# Patient Record
Sex: FEMALE | Race: WHITE | NOT HISPANIC OR LATINO | Employment: UNEMPLOYED | URBAN - NONMETROPOLITAN AREA
[De-identification: names, ages, dates, MRNs, and addresses within clinical notes are randomized per-mention and may not be internally consistent; named-entity substitution may affect disease eponyms.]

---

## 2017-05-15 ENCOUNTER — OFFICE VISIT (OUTPATIENT)
Dept: OBSTETRICS AND GYNECOLOGY | Facility: CLINIC | Age: 27
End: 2017-05-15

## 2017-05-15 VITALS
DIASTOLIC BLOOD PRESSURE: 68 MMHG | HEIGHT: 59 IN | WEIGHT: 119 LBS | BODY MASS INDEX: 23.99 KG/M2 | SYSTOLIC BLOOD PRESSURE: 112 MMHG

## 2017-05-15 DIAGNOSIS — R59.9 LYMPH NODE ENLARGEMENT: Primary | ICD-10-CM

## 2017-05-15 PROCEDURE — 99213 OFFICE O/P EST LOW 20 MIN: CPT | Performed by: NURSE PRACTITIONER

## 2017-05-15 RX ORDER — BUTALBITAL, ACETAMINOPHEN, CAFFEINE AND CODEINE PHOSPHATE 300; 50; 40; 30 MG/1; MG/1; MG/1; MG/1
CAPSULE ORAL
COMMUNITY
End: 2018-02-07

## 2017-07-10 ENCOUNTER — TELEPHONE (OUTPATIENT)
Dept: OBSTETRICS AND GYNECOLOGY | Facility: CLINIC | Age: 27
End: 2017-07-10

## 2017-07-10 NOTE — TELEPHONE ENCOUNTER
----- Message from Sandra Elaine sent at 7/10/2017 11:43 AM CDT -----  Contact: [phone]  PT CALLED AND IS A GABRIELA PT. SHE SAID THAT [facility] HAS BEEN TRYING TO GET A HOLD OF GABRIELA'S OFFCE. THEY ARE HAVING PROBLEMS FILLING HER BC. PT WOULD LIKE A CALL BACK.    THANKS

## 2017-08-18 ENCOUNTER — HOSPITAL ENCOUNTER (EMERGENCY)
Facility: HOSPITAL | Age: 27
Discharge: HOME | End: 2017-08-19
Admitting: EMERGENCY MEDICINE

## 2017-08-18 DIAGNOSIS — R51.9 HEADACHE, UNSPECIFIED HEADACHE TYPE: Primary | ICD-10-CM

## 2017-08-18 PROCEDURE — 99283 EMERGENCY DEPT VISIT LOW MDM: CPT

## 2017-08-18 PROCEDURE — 96372 THER/PROPH/DIAG INJ SC/IM: CPT

## 2017-08-19 VITALS
HEIGHT: 59 IN | HEART RATE: 89 BPM | SYSTOLIC BLOOD PRESSURE: 112 MMHG | RESPIRATION RATE: 16 BRPM | TEMPERATURE: 98.4 F | WEIGHT: 125 LBS | DIASTOLIC BLOOD PRESSURE: 81 MMHG | OXYGEN SATURATION: 99 % | BODY MASS INDEX: 25.2 KG/M2

## 2017-08-19 PROCEDURE — 25010000002 DIPHENHYDRAMINE PER 50 MG: Performed by: EMERGENCY MEDICINE

## 2017-08-19 PROCEDURE — 96372 THER/PROPH/DIAG INJ SC/IM: CPT

## 2017-08-19 PROCEDURE — 25010000002 METOCLOPRAMIDE PER 10 MG: Performed by: EMERGENCY MEDICINE

## 2017-08-19 PROCEDURE — 25010000002 KETOROLAC TROMETHAMINE PER 15 MG: Performed by: EMERGENCY MEDICINE

## 2017-08-19 RX ORDER — CETIRIZINE HYDROCHLORIDE 10 MG/1
10 TABLET ORAL DAILY
COMMUNITY

## 2017-08-19 RX ORDER — DIPHENHYDRAMINE HYDROCHLORIDE 50 MG/ML
25 INJECTION INTRAMUSCULAR; INTRAVENOUS ONCE
Status: COMPLETED | OUTPATIENT
Start: 2017-08-19 | End: 2017-08-19

## 2017-08-19 RX ORDER — IBUPROFEN 600 MG/1
600 TABLET ORAL EVERY 8 HOURS PRN
Qty: 20 TABLET | Refills: 0 | Status: SHIPPED | OUTPATIENT
Start: 2017-08-19 | End: 2017-08-26

## 2017-08-19 RX ORDER — METOCLOPRAMIDE HYDROCHLORIDE 5 MG/ML
10 INJECTION INTRAMUSCULAR; INTRAVENOUS ONCE
Status: COMPLETED | OUTPATIENT
Start: 2017-08-19 | End: 2017-08-19

## 2017-08-19 RX ORDER — KETOROLAC TROMETHAMINE 15 MG/ML
60 INJECTION, SOLUTION INTRAMUSCULAR; INTRAVENOUS ONCE
Status: COMPLETED | OUTPATIENT
Start: 2017-08-19 | End: 2017-08-19

## 2017-08-19 RX ADMIN — DIPHENHYDRAMINE HYDROCHLORIDE 25 MG: 50 INJECTION INTRAMUSCULAR; INTRAVENOUS at 02:33

## 2017-08-19 RX ADMIN — METOCLOPRAMIDE 10 MG: 5 INJECTION, SOLUTION INTRAMUSCULAR; INTRAVENOUS at 02:32

## 2017-08-19 RX ADMIN — KETOROLAC TROMETHAMINE 60 MG: 15 INJECTION, SOLUTION INTRAMUSCULAR; INTRAVENOUS at 02:36

## 2017-08-19 NOTE — ED PROVIDER NOTES
"Subjective   HPI Comments: She refused IV medication and to start an IV she just wants\" shots of Toradol and what ever and to go home\"    Patient is a 27 y.o. female presenting with migraines.   History provided by:  Patient  Migraine   Pain location:  Occipital  Quality:  Sharp  Radiates to:  Does not radiate  Onset quality:  Gradual  Duration:  2 days  Timing:  Constant  Progression:  Waxing and waning  Chronicity:  Recurrent  Similar to prior headaches: yes    Context: bright light and emotional stress    Context: not caffeine, not coughing and not eating    Relieved by:  Nothing  Worsened by:  Nothing  Associated symptoms: no abdominal pain, no back pain, no congestion, no cough, no dizziness, no fatigue, no fever, no nausea, no neck pain, no numbness, no seizures and no sore throat        Review of Systems   Constitutional: Negative for activity change, appetite change, fatigue and fever.   HENT: Negative for congestion, facial swelling, mouth sores, nosebleeds, sore throat and trouble swallowing.    Eyes: Negative for discharge, redness and itching.   Respiratory: Negative for apnea, cough and wheezing.    Cardiovascular: Negative for chest pain and palpitations.   Gastrointestinal: Negative for abdominal pain, blood in stool and nausea.   Endocrine: Negative for cold intolerance, heat intolerance, polydipsia, polyphagia and polyuria.   Genitourinary: Negative for difficulty urinating, dysuria, flank pain, frequency and hematuria.   Musculoskeletal: Negative for back pain, gait problem, joint swelling and neck pain.   Skin: Negative.  Negative for color change, pallor and rash.   Allergic/Immunologic: Negative for environmental allergies.   Neurological: Negative for dizziness, seizures, syncope, speech difficulty, light-headedness, numbness and headaches.   Hematological: Negative for adenopathy.   Psychiatric/Behavioral: Negative for agitation, behavioral problems, confusion and sleep disturbance. The " patient is not nervous/anxious.        Past Medical History:   Diagnosis Date   • Acute bronchitis    • Acute otitis media    • Acute pharyngitis    • Allergic rhinitis    • Cough    • Dermatitis    • Dizziness and giddiness    • Gastroesophageal reflux disease    • Insertion of implantable subdermal contraceptive     insertion of subcutaneous contraceptive   • Irregular menstruation    • Migraine    • Nausea    • Nausea and vomiting    • Routine postpartum follow-up    • Surveillance of implantable subdermal contraceptive    • Torticollis    • Upper respiratory infection    • Viral gastroenteritis        Allergies   Allergen Reactions   • Codeine    • Morphine And Related        Past Surgical History:   Procedure Laterality Date   •  SECTION PRIMARY  2013   • INJECTION OF MEDICATION  2016    toradol   • INJECTION OF MEDICATION  2016    zofran       Family History   Problem Relation Age of Onset   • Other Brother      brittle bone disease   • Alzheimer's disease Other    • Asthma Other    • Bipolar disorder Other    • Dementia Other    • Diabetes Other    • Heart disease Other    • Hypertension Other    • Migraines Other    • Thyroid disease Other    • Other Other      smoking tobacco       Social History     Social History   • Marital status: Single     Spouse name: N/A   • Number of children: N/A   • Years of education: N/A     Social History Main Topics   • Smoking status: Former Smoker   • Smokeless tobacco: None   • Alcohol use 0.6 oz/week     1 Glasses of wine per week   • Drug use: No   • Sexual activity: Yes     Partners: Male     Birth control/ protection: Pill     Other Topics Concern   • None     Social History Narrative           Objective   Physical Exam   Constitutional: She is oriented to person, place, and time. She appears well-developed and well-nourished.   HENT:   Head: Normocephalic and atraumatic.   Nose: Nose normal.   Mouth/Throat: Oropharynx is clear and moist.    Eyes: Conjunctivae and EOM are normal. Pupils are equal, round, and reactive to light.   Neck: Normal range of motion. Neck supple.   Cardiovascular: Normal rate, regular rhythm, normal heart sounds and intact distal pulses.    Pulmonary/Chest: Effort normal and breath sounds normal.   Abdominal: Soft. Bowel sounds are normal.   Musculoskeletal: Normal range of motion.   Neurological: She is alert and oriented to person, place, and time.   Skin: Skin is warm and dry.   Psychiatric: She has a normal mood and affect. Her behavior is normal. Judgment and thought content normal.   Nursing note and vitals reviewed.      Procedures         ED Course  ED Course                  MDM    Final diagnoses:   Headache, unspecified headache type            Sebastian Pyle MD  08/19/17 0228

## 2017-08-19 NOTE — ED TRIAGE NOTES
Pt with hx of migraine headaches presents to ED with c/o headache x2 days.  Pt states she woke up two days ago with a crick in her neck and she has had a headache ever since.  Pt states she takes Magnesium daily as a preventive but has run out of her usual headache pain medication, Fiorcet.

## 2018-01-02 RX ORDER — DROSPIRENONE AND ETHINYL ESTRADIOL 0.02-3(28)
1 KIT ORAL DAILY
Qty: 28 TABLET | Refills: 2 | Status: SHIPPED | OUTPATIENT
Start: 2018-01-02 | End: 2018-05-01 | Stop reason: SDUPTHER

## 2018-02-07 ENCOUNTER — APPOINTMENT (OUTPATIENT)
Dept: LAB | Facility: HOSPITAL | Age: 28
End: 2018-02-07

## 2018-02-07 ENCOUNTER — OFFICE VISIT (OUTPATIENT)
Dept: OBSTETRICS AND GYNECOLOGY | Facility: CLINIC | Age: 28
End: 2018-02-07

## 2018-02-07 VITALS
DIASTOLIC BLOOD PRESSURE: 89 MMHG | SYSTOLIC BLOOD PRESSURE: 126 MMHG | HEART RATE: 87 BPM | WEIGHT: 125 LBS | BODY MASS INDEX: 25.2 KG/M2 | HEIGHT: 59 IN

## 2018-02-07 DIAGNOSIS — R59.9 LYMPH NODE ENLARGEMENT: ICD-10-CM

## 2018-02-07 DIAGNOSIS — Z30.41 SURVEILLANCE OF CONTRACEPTIVE PILL: ICD-10-CM

## 2018-02-07 DIAGNOSIS — N92.1 BREAKTHROUGH BLEEDING ON BIRTH CONTROL PILLS: Primary | ICD-10-CM

## 2018-02-07 LAB
ALBUMIN SERPL-MCNC: 4.6 G/DL (ref 3.4–4.8)
ALBUMIN/GLOB SERPL: 1.4 G/DL (ref 1.1–1.8)
ALP SERPL-CCNC: 73 U/L (ref 38–126)
ALT SERPL W P-5'-P-CCNC: 31 U/L (ref 9–52)
ANION GAP SERPL CALCULATED.3IONS-SCNC: 12 MMOL/L (ref 5–15)
AST SERPL-CCNC: 24 U/L (ref 14–36)
BILIRUB SERPL-MCNC: <0.1 MG/DL (ref 0.2–1.3)
BUN BLD-MCNC: 9 MG/DL (ref 7–21)
BUN/CREAT SERPL: 13.4 (ref 7–25)
CALCIUM SPEC-SCNC: 9.9 MG/DL (ref 8.4–10.2)
CHLORIDE SERPL-SCNC: 100 MMOL/L (ref 95–110)
CO2 SERPL-SCNC: 24 MMOL/L (ref 22–31)
CREAT BLD-MCNC: 0.67 MG/DL (ref 0.5–1)
DEPRECATED RDW RBC AUTO: 39.8 FL (ref 36.4–46.3)
ERYTHROCYTE [DISTWIDTH] IN BLOOD BY AUTOMATED COUNT: 12.3 % (ref 11.5–14.5)
GFR SERPL CREATININE-BSD FRML MDRD: 105 ML/MIN/1.73 (ref 71–165)
GLOBULIN UR ELPH-MCNC: 3.3 GM/DL (ref 2.3–3.5)
GLUCOSE BLD-MCNC: 94 MG/DL (ref 60–100)
HBA1C MFR BLD: 5.2 % (ref 4–5.6)
HCT VFR BLD AUTO: 37.8 % (ref 35–45)
HGB BLD-MCNC: 12.6 G/DL (ref 12–15.5)
MCH RBC QN AUTO: 29.8 PG (ref 26.5–34)
MCHC RBC AUTO-ENTMCNC: 33.3 G/DL (ref 31.4–36)
MCV RBC AUTO: 89.4 FL (ref 80–98)
PLATELET # BLD AUTO: 305 10*3/MM3 (ref 150–450)
PMV BLD AUTO: 10.7 FL (ref 8–12)
POTASSIUM BLD-SCNC: 4.6 MMOL/L (ref 3.5–5.1)
PROT SERPL-MCNC: 7.9 G/DL (ref 6.3–8.6)
RBC # BLD AUTO: 4.23 10*6/MM3 (ref 3.77–5.16)
SODIUM BLD-SCNC: 136 MMOL/L (ref 137–145)
TSH SERPL DL<=0.05 MIU/L-ACNC: 1.87 MIU/ML (ref 0.46–4.68)
WBC NRBC COR # BLD: 9.06 10*3/MM3 (ref 3.2–9.8)

## 2018-02-07 PROCEDURE — 99213 OFFICE O/P EST LOW 20 MIN: CPT | Performed by: NURSE PRACTITIONER

## 2018-02-07 PROCEDURE — 83036 HEMOGLOBIN GLYCOSYLATED A1C: CPT | Performed by: NURSE PRACTITIONER

## 2018-02-07 PROCEDURE — 82670 ASSAY OF TOTAL ESTRADIOL: CPT | Performed by: NURSE PRACTITIONER

## 2018-02-07 PROCEDURE — 80050 GENERAL HEALTH PANEL: CPT | Performed by: NURSE PRACTITIONER

## 2018-02-07 PROCEDURE — 84144 ASSAY OF PROGESTERONE: CPT | Performed by: NURSE PRACTITIONER

## 2018-02-07 PROCEDURE — 36415 COLL VENOUS BLD VENIPUNCTURE: CPT | Performed by: NURSE PRACTITIONER

## 2018-02-07 RX ORDER — BUTALBITAL, ACETAMINOPHEN AND CAFFEINE 50; 325; 40 MG/1; MG/1; MG/1
TABLET ORAL
Refills: 2 | COMMUNITY
Start: 2018-01-08 | End: 2019-11-08 | Stop reason: HOSPADM

## 2018-02-07 RX ORDER — LANOLIN ALCOHOL/MO/W.PET/CERES
1000 CREAM (GRAM) TOPICAL DAILY
COMMUNITY

## 2018-02-07 RX ORDER — DOXYCYCLINE HYCLATE 100 MG/1
100 CAPSULE ORAL 2 TIMES DAILY
Qty: 14 CAPSULE | Refills: 0 | Status: SHIPPED | OUTPATIENT
Start: 2018-02-07 | End: 2018-02-14

## 2018-02-07 RX ORDER — CYCLOBENZAPRINE HCL 10 MG
TABLET ORAL
Refills: 0 | COMMUNITY
Start: 2018-01-08

## 2018-02-07 RX ORDER — ONDANSETRON 4 MG/1
TABLET, ORALLY DISINTEGRATING ORAL
Refills: 1 | COMMUNITY
Start: 2018-01-02

## 2018-02-07 RX ORDER — LORATADINE 10 MG/1
CAPSULE, LIQUID FILLED ORAL
COMMUNITY

## 2018-02-07 RX ORDER — FLUCONAZOLE 150 MG/1
TABLET ORAL
Qty: 2 TABLET | Refills: 0 | Status: SHIPPED | OUTPATIENT
Start: 2018-02-07 | End: 2019-11-08 | Stop reason: HOSPADM

## 2018-02-07 NOTE — PROGRESS NOTES
Subjective   Chief Complaint   Patient presents with   • Menstrual Problem     Grace Guerin is a 27 y.o. year old  presenting to be seen with complaints of trouble with her menses.   Current birth control method: OCP (estrogen/progesterone).    Patient's last menstrual period was 2018.    The last time she recalls having predictable regular cycles was December.  She started a period the first week of January as expected and it lasted the normal 3-5 days, then 10 days later she had another 3-5 day period in the middle of her pack and does not recall missing pills, taking new medications concurrently or being more stressed than usual.    · Additional notable symptoms: none  · Changes in weight: weight has been stable  · Recent increase in stress? no  · Is there associated pain ? no    Past 6 month menstrual history:    Cycle Frequency: regular, predictable and consistent every 28 - 32 days   Menstrual cycle character: flow is typically normal   Cycle Duration: 3 - 5   Number of heavy days of flows: 0   Dysmenorrhea: mild and is not affecting her activities of daily living   PMS: mild and is not affecting her activities of daily living   Intermenstrual bleeding present: {yes   Post-coital bleeding present: no     She is sexually active.  In the past 12 months there has not been new sexual partners.  Condoms are not typically used.  She would not like to be screened for STD's at today's exam.     Also c/o swollen lymph nodes in the right groin with tenderness when she walks and sits certain ways.    The following portions of the patient's history were reviewed and updated as appropriate:problem list, current medications, allergies, past medical history, past social history and past surgical history    Smoking status: Former Smoker                                                              Packs/day: 0.00      Years: 0.00         Smokeless status: Not on file                       Review of Systems  "  Constitutional: Negative for activity change, appetite change, fatigue and unexpected weight change.   HENT: Negative for congestion and trouble swallowing.    Respiratory: Negative for chest tightness and shortness of breath.    Cardiovascular: Negative for chest pain, palpitations and leg swelling.   Gastrointestinal: Negative for abdominal distention, abdominal pain, blood in stool, constipation, diarrhea, nausea and vomiting.   Endocrine: Negative for cold intolerance, heat intolerance, polydipsia, polyphagia and polyuria.   Genitourinary: Positive for menstrual problem. Negative for difficulty urinating, dyspareunia, dysuria, genital sores, pelvic pain, urgency, vaginal bleeding, vaginal discharge and vaginal pain.        Pain in right groin; reports tick bite a few months ago in that area.     Musculoskeletal: Negative for gait problem and myalgias.   Skin: Negative for color change, pallor and rash.   Neurological: Negative for dizziness, weakness, light-headedness and headaches.   Hematological: Negative for adenopathy.   Psychiatric/Behavioral: Negative for agitation, confusion, dysphoric mood, self-injury and suicidal ideas. The patient is not nervous/anxious.          Objective   /89  Pulse 87  Ht 149.9 cm (59\")  Wt 56.7 kg (125 lb)  LMP 02/07/2018  Breastfeeding? No  BMI 25.25 kg/m2    General:  well developed; well nourished  no acute distress  appears stated age   Skin:  No suspicious lesions seen   Thyroid: normal to inspection and palpation   Lungs:  breathing is unlabored  clear to auscultation bilaterally   Heart:  regular rate and rhythm, S1, S2 normal, no murmur, click, rub or gallop   Breasts:  Not performed.   Abdomen: soft, non-tender; no masses  no umbilical or inginual hernias are present  no hepato-splenomegaly   Pelvis: Clinical staff was present for exam  External genitalia:  normal appearance of the external genitalia including Bartholin's and [place]'s glands.  Left inguinal " lymph nodes not palpable. Right inguinal lymph nodes slightly enlarged and tender to light palpation.      Lab Review   Last pap: 9/13/16 normal    Imaging   No data reviewed         Diagnoses and all orders for this visit:    Breakthrough bleeding on birth control pills  -     TSH  -     Progesterone  -     Estradiol  -     Hemoglobin A1c  -     Comprehensive Metabolic Panel  -     CBC (No Diff)  -     doxycycline (VIBRAMYCIN) 100 MG capsule; Take 1 capsule by mouth 2 (Two) Times a Day for 7 days.  -     fluconazole (DIFLUCAN) 150 MG tablet; Take 1 tablet by mouth today and repeat in 4 days.    Surveillance of contraceptive pill    Lymph node enlargement        This note was electronically signed.    Joycelyn Reynoso, ANNABEL    February 7, 2018

## 2018-02-08 ENCOUNTER — TELEPHONE (OUTPATIENT)
Dept: OBSTETRICS AND GYNECOLOGY | Facility: CLINIC | Age: 28
End: 2018-02-08

## 2018-02-08 LAB
ESTRADIOL SERPL HS-MCNC: 33 PG/ML
PROGEST SERPL-MCNC: 0.1 NG/ML

## 2018-02-08 NOTE — TELEPHONE ENCOUNTER
----- Message from ANNABEL Turk sent at 2/8/2018  8:23 AM CST -----  Please let her know that her hormones were normal.

## 2018-05-02 RX ORDER — DROSPIRENONE AND ETHINYL ESTRADIOL TABLETS 0.02-3(28)
KIT ORAL
Qty: 28 TABLET | Refills: 12 | Status: SHIPPED | OUTPATIENT
Start: 2018-05-02 | End: 2019-03-20 | Stop reason: SDUPTHER

## 2018-09-13 ENCOUNTER — TRANSCRIBE ORDERS (OUTPATIENT)
Dept: PHYSICAL THERAPY | Facility: HOSPITAL | Age: 28
End: 2018-09-13

## 2018-09-13 DIAGNOSIS — G43.019 COMMON MIGRAINE WITH INTRACTABLE MIGRAINE: Primary | ICD-10-CM

## 2018-09-20 ENCOUNTER — HOSPITAL ENCOUNTER (OUTPATIENT)
Dept: PHYSICAL THERAPY | Facility: HOSPITAL | Age: 28
Setting detail: THERAPIES SERIES
Discharge: HOME | End: 2018-09-20

## 2018-09-20 DIAGNOSIS — G43.111 INTRACTABLE MIGRAINE WITH AURA WITH STATUS MIGRAINOSUS: Primary | ICD-10-CM

## 2018-09-20 PROCEDURE — 97161 PT EVAL LOW COMPLEX 20 MIN: CPT

## 2018-09-20 NOTE — THERAPY EVALUATION
Outpatient Physical Therapy Ortho Initial Evaluation  [facility]     Patient Name: Grace Guerin  : 1990  MRN: [id number]  Today's Date: 2018      Visit Date: 2018    Patient Active Problem List   Diagnosis   • Surveillance of contraceptive pill   • Breakthrough bleeding on birth control pills        Past Medical History:   Diagnosis Date   • Acute bronchitis    • Acute otitis media    • Acute pharyngitis    • Allergic rhinitis    • Cough    • Dermatitis    • Dizziness and giddiness    • Gastroesophageal reflux disease    • Insertion of implantable subdermal contraceptive     insertion of subcutaneous contraceptive   • Irregular menstruation    • Migraine    • Nausea    • Nausea and vomiting    • Routine postpartum follow-up    • Surveillance of implantable subdermal contraceptive    • Torticollis    • Upper respiratory infection    • Viral gastroenteritis         Past Surgical History:   Procedure Laterality Date   •  SECTION PRIMARY  2013   • INJECTION OF MEDICATION  2016    toradol   • INJECTION OF MEDICATION  2016    zofran       Visit Dx:     ICD-10-CM ICD-9-CM   1. Intractable migraine with aura with status migrainosus G43.111 346.03             Patient History     Row Name 18 1300             History    Chief Complaint Pain  -MS      Type of Pain Shoulder pain;Neck pain   headaches  -MS      Date Current Problem(s) Began --   about 2 years ago  -MS      Brief Description of Current Complaint Patient is 29 yo who lives at home with her parents and her 4 yo child.  Patient has suffered from migraine headaches of insidious origin for approximately two year. Patient cites auras and numerous triggers which precede a full blown migraine attack such as hot and rainy weather. Patient's pain is in an L shaped pattern that run down the back of her head and neck where is diverges to the right and also presents in her right shoulder extending through  "upper trapezius and medial scapula. Patient also reports severe pain behind her right eye during migraines. Patient was seeing a chiropractor for several months earlier this year but she found that the results were mixed: sometimes improving and sometimes worsening her symptoms. Patient cannot take \"tryptans\" class of drugs which are often helpful for migraines as she reacts severely to them. Patient takes Flexeril and Fioricet (acetaminophen, butalbital, and caffeine) when she has an ongoing migraine which typically last 2-3 days during which always stays at home. She reports that she get aphasia and cannot drive when her symptoms are severe. Patient has small heart murmur which they told her not worry about for now (runs in her family). 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 meds; supplements; special glases (lens blocks out irritating light wavelengths (glasses help)   -MS      What clinical tests have you had for this problem? CT scan;Blood Work  -MS      Results of Clinical Tests Cat scan and blood work found nothing remarkable  -MS      History of Previous Related Injuries   -MS      Are you or can you be pregnant No  -MS         Pain     Pain Location Head   behind right eye, right scapula  -MS      Pain at Present 3  -MS      Pain at Best 0  -MS      Pain at Worst 10  -MS      Pain Frequency --   maybe two migraine a month  -MS      Pain Description Aching   feels like " knot in her shoulder  -MS      What Performance Factors Make the Current Problem(s) WORSE? low pressure; rain; hot weather; lights; driving at night; heavy lifting with right arm  -MS      What Performance Factors Make the Current Problem(s) BETTER? special glasses; ice pack on neck and back of head; meds; cold cloths, dark rooms; sleep  -MS      Is your sleep disturbed? No  -MS      Is medication used to assist with sleep? No  -MS      What position do you sleep in? Right sidelying  -MS      Difficulties with ADL's? child handling; mopping; bending over and scrubbing  -MS      Difficulties with recreational activities? pole dancing classes  -MS         Daily Activities    Primary Language English  -MS         Safety    Are you being hurt, hit, or frightened by anyone at home or in your life? No  -MS      Are you being neglected by a caregiver No  -MS        User Key  (r) = Recorded By, (t) = Taken By, (c) = Cosigned By    Initials Name Provider Type    Devonte Garcia PT Physical Therapist                PT Ortho     Row Name 09/20/18 1300       Posture/Observations    Alignment Options Forward head;Rounded shoulders  -MS       Cervical Palpation    Cervical Palpation- Location? Suboccipital;SCM;Cervical facets;Levator scapula;Upper traps;Occiput   patient presents mild palpable tenderness/increased tone  -MS    Occiput Bilateral:;Tender  -MS    Suboccipital Bilateral:;Tender  -MS    SCM Guarded/taut  -MS    Cervical Facets Right:;Elicits spasm   palpation of R C5-C6 refers pain to patient's right shoulder  -MS    Levator Scapula Bilateral:;Tender  -MS    Upper Traps Bilateral:;Tender  -MS       General ROM    GENERAL ROM COMMENTS UE and cervical spine WNL  -MS       MMT (Manual Muscle Testing)    General MMT Comments Shoulder ABD and flex/EWR/IR 4+/5 throughout  -MS      User Key  (r) = Recorded By, (t) = Taken By, (c) = Cosigned By    Initials Name Provider Type    Devonte Garcia PT Physical  Therapist                      Therapy Education  Education Details: Discussed with patient that muscle tightness in her neck and shoulders can be factor in producing headaches           PT OP Goals     Row Name 09/20/18 1300          PT Short Term Goals    STG Date to Achieve 10/11/18  -MS     STG 1 patient will be independent with HEP  -MS     STG 2 patient will presents with right shoulder pain of 0/10 at rest or with neck movement  -MS     STG 3 Patient will present with reduced palpable muscle tightness in her upper traps bilaterally  -MS     STG 4 patient will presents with reduced frequency of migraine headaches  -MS        Time Calculation    PT Goal Re-Cert Due Date 10/11/18  -MS       User Key  (r) = Recorded By, (t) = Taken By, (c) = Cosigned By    Initials Name Provider Type    MS Devonte Wheat, PT Physical Therapist                PT Assessment/Plan     Row Name 09/20/18 0758          PT Assessment    Functional Limitations Limitations in community activities;Performance in leisure activities;Limitations in functional capacity and performance  -MS     Impairments Pain;Muscle strength;Posture;Impaired postural alignment  -MS     Assessment Comments Patient presents with migraine headaches with frequency of severe symptoms in the range of 2-3 episodes per month. Patient also presents with low level constant pain in the pack of her head and neck which also radiates into her right shoulder and can be exacerbated by turning her head to the right. Pateint presents with palpable tendenes mostly on the right side of her neck but also generalized muscle tightnes hrough her suboccipitals, neck, shoulders bilaterally. Patient will benefit from skilled physical herapy including manual therapy to reduce pain and associated muscle tension, with the possible reduction in migraine frequency and severity of migraine symptoms.   -MS     Please refer to paper survey for additional self-reported information Yes  -MS      Rehab Potential Good  -MS     Patient/caregiver participated in establishment of treatment plan and goals Yes  -MS     Patient would benefit from skilled therapy intervention Yes  -MS        PT Plan    PT Frequency 2x/week  -MS     Planned CPT's? PT EVAL LOW COMPLEXITY: 14215;PT THER PROC EA 15 MIN: 04404;PT THER ACT EA 15 MIN: 12125;PT ELECTRICAL STIM UNATTEND: ;PT MANUAL THERAPY EA 15 MIN: 21980;PT HOT/COLD PACK WC NONMCARE: 20821  -MS     Physical Therapy Interventions (Optional Details) stretching;strengthening;postural re-education;modalities;joint mobilization;home exercise program  -MS       User Key  (r) = Recorded By, (t) = Taken By, (c) = Cosigned By    Initials Name Provider Type    Devonte Garcia, PT Physical Therapist                                        Time Calculation:     Therapy Suggested Charges     Code   Minutes Charges    None             Start Time: 1255  Stop Time: 1344  Time Calculation (min): 49 min     Therapy Charges for Today     Code Description Service Date Service Provider Modifiers Qty    22489232274 HC PT EVAL LOW COMPLEXITY 2 9/20/2018 Devonte Wheat, PT GP 1                    Devonte Wheat PT  9/20/2018

## 2018-10-03 ENCOUNTER — APPOINTMENT (OUTPATIENT)
Dept: PHYSICAL THERAPY | Facility: HOSPITAL | Age: 28
End: 2018-10-03

## 2018-10-08 ENCOUNTER — HOSPITAL ENCOUNTER (OUTPATIENT)
Dept: PHYSICAL THERAPY | Facility: HOSPITAL | Age: 28
Setting detail: THERAPIES SERIES
Discharge: HOME | End: 2018-10-08

## 2018-10-08 DIAGNOSIS — G43.111 INTRACTABLE MIGRAINE WITH AURA WITH STATUS MIGRAINOSUS: Primary | ICD-10-CM

## 2018-10-08 PROCEDURE — 97140 MANUAL THERAPY 1/> REGIONS: CPT

## 2018-10-08 PROCEDURE — 97110 THERAPEUTIC EXERCISES: CPT

## 2018-10-08 NOTE — THERAPY TREATMENT NOTE
Outpatient Physical Therapy Ortho Treatment Note  [facility]     Patient Name: Grace Guerin  : 1990  MRN: [id number]  Today's Date: 10/8/2018      Visit Date: 10/08/2018   Pt's pre tx pain 3/10 post tx pain 4/10.    Pt reports 0% improvement.   2 2/ visits w/ 8 visits approved.   Next recert 10/18/2018.    Visit Dx:    ICD-10-CM ICD-9-CM   1. Intractable migraine with aura with status migrainosus G43.111 346.03       Patient Active Problem List   Diagnosis   • Surveillance of contraceptive pill   • Breakthrough bleeding on birth control pills        Past Medical History:   Diagnosis Date   • Acute bronchitis    • Acute otitis media    • Acute pharyngitis    • Allergic rhinitis    • Cough    • Dermatitis    • Dizziness and giddiness    • Gastroesophageal reflux disease    • Insertion of implantable subdermal contraceptive     insertion of subcutaneous contraceptive   • Irregular menstruation    • Migraine    • Nausea    • Nausea and vomiting    • Routine postpartum follow-up    • Surveillance of implantable subdermal contraceptive    • Torticollis    • Upper respiratory infection    • Viral gastroenteritis         Past Surgical History:   Procedure Laterality Date   •  SECTION PRIMARY  2013   • INJECTION OF MEDICATION  2016    toradol   • INJECTION OF MEDICATION  2016    zofran                             PT Assessment/Plan     Row Name 10/08/18 1437          PT Assessment    Assessment Comments pt tolerated tx well.  pt demo'd some palpable tenderness on R side of her neck.  Pt verbalized understanding of HEP. no new goals met.   -        PT Plan    PT Frequency 2x/week  -     PT Plan Comments cont poc f/u HEP  -       User Key  (r) = Recorded By, (t) = Taken By, (c) = Cosigned By    Initials Name Provider Type     Vania Barrientos, PTA Physical Therapy Assistant                Modalities     Row Name 10/08/18 1432             Ice    Ice Applied No   pt refused  ice and e-stim  -        User Key  (r) = Recorded By, (t) = Taken By, (c) = Cosigned By    Initials Name Provider Type     Vania Barrientos, PTA Physical Therapy Assistant                Exercises     Row Name 10/08/18 1435             Subjective Comments    Subjective Comments Pt reports had a migraine ~1 week ago that has just recently let go.    -         Subjective Pain    Able to rate subjective pain? yes  -      Pre-Treatment Pain Level 3   normal amount of pain  -      Post-Treatment Pain Level 4  -AH         Exercise 1    Exercise Name 1 Manual cervical  traction and sub occipital release   -AH      Time 1 12  -AH         Exercise 2    Exercise Name 2 chin tucks   -AH      Reps 2 10  -AH      Time 2 5 sec hold   -AH         Exercise 3    Exercise Name 3 UT stretch B  -AH      Reps 3 2  -AH      Time 3 30 sec each  -AH         Exercise 4    Exercise Name 4 lev S  -AH      Reps 4 2  -AH      Time 4 30 sec each   -AH         Exercise 5    Exercise Name 5 doorway S  -AH      Sets 5 2  -AH      Reps 5 30sec each   -AH         Exercise 6    Exercise Name 6 scap squeeze w/ red t-band   -      Sets 6 2  -AH      Reps 6 10  -AH      Time 6 5 sec hold   -AH        User Key  (r) = Recorded By, (t) = Taken By, (c) = Cosigned By    Initials Name Provider Type     Vania Barrientos, PTA Physical Therapy Assistant                               PT OP Goals     Row Name 10/08/18 1435          PT Short Term Goals    STG Date to Achieve 10/11/18  -     STG 1 patient will be independent with HEP  -     STG 2 patient will presents with right shoulder pain of 0/10 at rest or with neck movement  -     STG 3 Patient will present with reduced palpable muscle tightness in her upper traps bilaterally  -     STG 4 patient will presents with reduced frequency of migraine headaches  -        Time Calculation    PT Goal Re-Cert Due Date 10/11/18  -       User Key  (r) = Recorded By, (t) = Taken By, (c) = Cosigned By     Initials Name Provider Type     Vania Barrientos PTA Physical Therapy Assistant          Therapy Education  Education Details: given handouts for doorway s, lev s, ut s, chin tucks and scap squeeze w/ t-band  Given: HEP  Program: New  How Provided: Demonstration, Written  Provided to: Patient  Level of Understanding: Verbalized, Demonstrated              Time Calculation:   Start Time: 1435  Stop Time: 1515  Time Calculation (min): 40 min  Therapy Suggested Charges     Code   Minutes Charges    None           Therapy Charges for Today     Code Description Service Date Service Provider Modifiers Qty    39809371872 HC PT THER PROC EA 15 MIN 10/8/2018 Vania Barrientos, OSORIO GP 2    86809654656 HC PT MANUAL THERAPY EA 15 MIN 10/8/2018 Vania Barrientos, OSORIO GP 1                    Vania Barrientos PTA  10/8/2018

## 2019-03-21 RX ORDER — DROSPIRENONE AND ETHINYL ESTRADIOL TABLETS 0.02-3(28)
KIT ORAL
Qty: 28 TABLET | Refills: 0 | Status: SHIPPED | OUTPATIENT
Start: 2019-03-21 | End: 2019-04-22 | Stop reason: SDUPTHER

## 2019-04-18 DIAGNOSIS — Z30.019 ENCOUNTER FOR FEMALE BIRTH CONTROL: Primary | ICD-10-CM

## 2019-04-18 RX ORDER — DROSPIRENONE AND ETHINYL ESTRADIOL 0.02-3(28)
KIT ORAL
Qty: 28 TABLET | Refills: 3 | OUTPATIENT
Start: 2019-04-18

## 2019-04-22 ENCOUNTER — TELEPHONE (OUTPATIENT)
Dept: OBSTETRICS AND GYNECOLOGY | Facility: CLINIC | Age: 29
End: 2019-04-22

## 2019-04-22 RX ORDER — DROSPIRENONE AND ETHINYL ESTRADIOL 0.02-3(28)
1 KIT ORAL DAILY
Qty: 28 TABLET | Refills: 5 | Status: SHIPPED | OUTPATIENT
Start: 2019-04-22 | End: 2019-08-19 | Stop reason: SDUPTHER

## 2019-04-22 NOTE — TELEPHONE ENCOUNTER
Called and spoke with the pt.  I informed her that I sent in a 6 month supply of her BC to [facility], but that after that 6 month period, it will be time for her to have a pap since her last pap was in 2016.  Pt verbalized understanding.

## 2019-08-20 RX ORDER — DROSPIRENONE AND ETHINYL ESTRADIOL 0.02-3(28)
1 KIT ORAL DAILY
Qty: 28 TABLET | Refills: 0 | Status: SHIPPED | OUTPATIENT
Start: 2019-08-20 | End: 2019-09-16 | Stop reason: SDUPTHER

## 2019-09-16 ENCOUNTER — TELEPHONE (OUTPATIENT)
Dept: OBSTETRICS AND GYNECOLOGY | Facility: CLINIC | Age: 29
End: 2019-09-16

## 2019-09-16 RX ORDER — DROSPIRENONE AND ETHINYL ESTRADIOL 0.02-3(28)
1 KIT ORAL DAILY
Qty: 28 TABLET | Refills: 0 | OUTPATIENT
Start: 2019-09-16

## 2019-09-16 RX ORDER — DROSPIRENONE AND ETHINYL ESTRADIOL 0.02-3(28)
1 KIT ORAL DAILY
Qty: 28 TABLET | Refills: 0 | Status: SHIPPED | OUTPATIENT
Start: 2019-09-16 | End: 2019-11-08 | Stop reason: SDUPTHER

## 2019-09-16 NOTE — TELEPHONE ENCOUNTER
THIS PT'S NUMBER IS NOT IN SERVICE.  I WILL SEND IN ONLY A 1 MONTH REFILL.  SHE WAS TOLD IN April THAT SHE HAD TO COME IN FOR A PAP AND IT IS DOCUMENTED THAT SHE VERBALIZED UNDERSTANDING THAT SHE NEEDS ONE.

## 2019-11-08 ENCOUNTER — OFFICE VISIT (OUTPATIENT)
Dept: OBSTETRICS AND GYNECOLOGY | Facility: CLINIC | Age: 29
End: 2019-11-08

## 2019-11-08 ENCOUNTER — TELEPHONE (OUTPATIENT)
Dept: OBSTETRICS AND GYNECOLOGY | Facility: CLINIC | Age: 29
End: 2019-11-08

## 2019-11-08 VITALS
DIASTOLIC BLOOD PRESSURE: 60 MMHG | BODY MASS INDEX: 23.39 KG/M2 | WEIGHT: 116 LBS | HEIGHT: 59 IN | SYSTOLIC BLOOD PRESSURE: 102 MMHG

## 2019-11-08 DIAGNOSIS — G43.101 MIGRAINE WITH AURA AND WITH STATUS MIGRAINOSUS, NOT INTRACTABLE: ICD-10-CM

## 2019-11-08 DIAGNOSIS — Z30.41 SURVEILLANCE FOR BIRTH CONTROL, ORAL CONTRACEPTIVES: Primary | ICD-10-CM

## 2019-11-08 DIAGNOSIS — Z01.419 PAP TEST, AS PART OF ROUTINE GYNECOLOGICAL EXAMINATION: ICD-10-CM

## 2019-11-08 PROCEDURE — 99395 PREV VISIT EST AGE 18-39: CPT | Performed by: NURSE PRACTITIONER

## 2019-11-08 PROCEDURE — G0123 SCREEN CERV/VAG THIN LAYER: HCPCS | Performed by: NURSE PRACTITIONER

## 2019-11-08 PROCEDURE — 87624 HPV HI-RISK TYP POOLED RSLT: CPT | Performed by: NURSE PRACTITIONER

## 2019-11-08 RX ORDER — DROSPIRENONE AND ETHINYL ESTRADIOL 0.02-3(28)
1 KIT ORAL DAILY
Qty: 28 TABLET | Refills: 0 | Status: SHIPPED | OUTPATIENT
Start: 2019-11-08 | End: 2019-11-08

## 2019-11-08 RX ORDER — TOPIRAMATE 25 MG/1
25 TABLET ORAL 2 TIMES DAILY
Refills: 0 | COMMUNITY
Start: 2019-10-31

## 2019-11-08 NOTE — PROGRESS NOTES
"Subjective   Grace Guerin is a 29 y.o. here for birth control and gyn annual    LMP: none with COCs  BC: Marianela  PAP: 9/13/2016, NIL    Pt desires to continue taking Marianela for birth control. States she has been on Marianela for \"years\" and it has worked for her well. She briefly used Nexplanon in 2013 but had irregular bleeding with it. Pt endorses having migraines aura occurring a few times a month. It has been ongoing issue and takes Topamax but reports she is about to start taking Emagality. She is insistent on continuing with Marianela and verbalizes understanding of the risks with concurrent use of COCs with migraines with aura. She is refusing to consider other form of contraceptives; she does not want IUD because \"3 of my friend's have had IUDs and they had problems with the IUD moving\" and believes the pain of getting an IUD is not acceptable for her.       Gynecologic Exam   The patient's pertinent negatives include no genital itching, genital lesions, genital odor, genital rash, missed menses, pelvic pain, vaginal bleeding or vaginal discharge. Associated symptoms include headaches. Pertinent negatives include no abdominal pain, chills, constipation, diarrhea, dysuria, fever, frequency, nausea, rash or sore throat. Associated symptoms comments: Migraines with aura. Nothing aggravates the symptoms. Treatments tried: Topamax. The treatment provided moderate relief. She is sexually active. It is unknown whether or not her partner has an STD. She uses oral contraceptives for contraception. There is no history of a gynecological surgery, menorrhagia or metrorrhagia.   Menstrual Problem   Associated symptoms include headaches. Pertinent negatives include no abdominal pain, chest pain, chills, fatigue, fever, nausea, rash, sore throat or weakness.       The following portions of the patient's history were reviewed and updated as appropriate: allergies, current medications, past family history, past medical history, past " social history, past surgical history and problem list.    Review of Systems   Constitutional: Negative for chills, fatigue, fever, unexpected weight gain and unexpected weight loss.   HENT: Negative for sneezing and sore throat.    Respiratory: Negative for shortness of breath.    Cardiovascular: Negative for chest pain and palpitations.   Gastrointestinal: Negative for abdominal pain, constipation, diarrhea and nausea.   Endocrine: Negative for cold intolerance and heat intolerance.   Genitourinary: Negative for amenorrhea, breast discharge, breast lump, breast pain, difficulty urinating, dysuria, frequency, menorrhagia, menstrual problem, missed menses, pelvic pain, pelvic pressure, urinary incontinence, vaginal bleeding, vaginal discharge and vaginal pain.   Skin: Negative for rash.   Neurological: Positive for headache. Negative for weakness.        Migraines with aura   Psychiatric/Behavioral: Negative for sleep disturbance, depressed mood and stress.       Objective   Physical Exam   Constitutional: She is oriented to person, place, and time. She appears well-developed and well-nourished.   HENT:   Head: Normocephalic.   Neck: Normal range of motion. Neck supple.   Cardiovascular: Normal rate and regular rhythm.   Pulmonary/Chest: Effort normal and breath sounds normal.   Offered clinical breast exam; pt declined.    Abdominal: Soft.   Genitourinary: There is no rash, tenderness, lesion or injury on the right labia. There is no rash, tenderness, lesion or injury on the left labia.   Genitourinary Comments: PAP test obtained.    Musculoskeletal: Normal range of motion.   Neurological: She is alert and oriented to person, place, and time.   Skin: Skin is warm and dry.   Psychiatric: She has a normal mood and affect. Her behavior is normal.   Nursing note and vitals reviewed.       Assessment/Plan   Grace was seen today for gynecologic exam.    Diagnoses and all orders for this visit:    Surveillance for birth  control, oral contraceptives  -     Discontinue: drospirenone-ethinyl estradiol (TIFFANY,GIANVI) 3-0.02 MG per tablet; Take 1 tablet by mouth Daily.    Pap test, as part of routine gynecological examination  -     Liquid-based Pap Smear, Screening    Migraine with aura and with status migrainosus, not intractable      Counseled patient on the cardiovascular and stroke risks with concurrent use of COCs in individuals who have migraines with aura. I showed her the [facility] guidelines chart that indicates it is a category 4 (unacceptable health risk (method not to be used)). Counseled on the use of other forms of birth control that do not have estrogen to include: IUD, Nexplanon, Depo-Provera, POPs. Pt declines these contraceptives at this time and demands to continue with Tiffany. She also understands Topamax greater than 200mg daily can decrease the effectiveness of the pill, she is currently only taking 25 mg of topamax.  At this visit, I refilled the prescription and counseled patient to return if she experiences problems with her birth control. However, I have since canceled the prescription as I am not comfortable prescribing COCs to her. I have attempted to contact patient to inform her but she was unreachable at this time.

## 2019-11-11 ENCOUNTER — TELEPHONE (OUTPATIENT)
Dept: OBSTETRICS AND GYNECOLOGY | Facility: CLINIC | Age: 29
End: 2019-11-11

## 2019-11-11 NOTE — TELEPHONE ENCOUNTER
Attempted to call pt at [phone] but call went straight to voicemail that was not set up and I could not leave a message. I would like to discuss her birth control options.

## 2019-11-12 RX ORDER — DROSPIRENONE AND ETHINYL ESTRADIOL 0.02-3(28)
1 KIT ORAL DAILY
Qty: 28 TABLET | Refills: 0 | OUTPATIENT
Start: 2019-11-12

## 2019-11-13 LAB
GEN CATEG CVX/VAG CYTO-IMP: NORMAL
LAB AP CASE REPORT: NORMAL
LAB AP GYN ADDITIONAL INFORMATION: NORMAL
PATH INTERP SPEC-IMP: NORMAL
STAT OF ADQ CVX/VAG CYTO-IMP: NORMAL

## 2019-11-14 ENCOUNTER — TELEPHONE (OUTPATIENT)
Dept: OBSTETRICS AND GYNECOLOGY | Facility: CLINIC | Age: 29
End: 2019-11-14

## 2019-11-14 DIAGNOSIS — Z30.011 ENCOUNTER FOR BCP (BIRTH CONTROL PILLS) INITIAL PRESCRIPTION: Primary | ICD-10-CM

## 2019-11-14 LAB — HPV I/H RISK 4 DNA CVX QL PROBE+SIG AMP: NEGATIVE

## 2019-11-14 RX ORDER — ACETAMINOPHEN AND CODEINE PHOSPHATE 120; 12 MG/5ML; MG/5ML
1 SOLUTION ORAL DAILY
Qty: 28 TABLET | Refills: 12 | Status: SHIPPED | OUTPATIENT
Start: 2019-11-14 | End: 2020-11-13

## 2019-11-15 ENCOUNTER — DOCUMENTATION (OUTPATIENT)
Dept: OBSTETRICS AND GYNECOLOGY | Facility: CLINIC | Age: 29
End: 2019-11-15

## 2019-11-15 NOTE — PROGRESS NOTES
"Patient called the clinic yesterday (11/14) inquiring about her birth control prescription. Informed patient that I am unable to provide her a prescription for Marianela as she has migraines with aura and it is contraindicated for her. She is upset and states \"I can't believe you won't give me what I want. I have done my research.\" I informed her that there are guidelines for prescribing birth control. Informed patient again that taking birth control with estrogen increases her risk for a stroke. Offered patient Micronor as she desires birth control pills or she can see another provider to discuss her concerns. Informed patient that Micronor is a progestin only pill; it is important she takes it at the same time every day and taking it more than 3 hours when she should be taking it does decrease the effectiveness of the pill. Pt responds with \"Fine, go ahead and give me the other pill but I will not be coming back to you and I will nell you.\" Pt then hung up. Micronor prescription sent to the pharmacy.   "